# Patient Record
Sex: MALE | Employment: STUDENT | URBAN - METROPOLITAN AREA
[De-identification: names, ages, dates, MRNs, and addresses within clinical notes are randomized per-mention and may not be internally consistent; named-entity substitution may affect disease eponyms.]

---

## 2023-12-06 ENCOUNTER — OFFICE VISIT (OUTPATIENT)
Dept: PODIATRY | Facility: CLINIC | Age: 23
End: 2023-12-06
Payer: COMMERCIAL

## 2023-12-06 VITALS — WEIGHT: 153 LBS | RESPIRATION RATE: 18 BRPM | HEIGHT: 71 IN | BODY MASS INDEX: 21.42 KG/M2

## 2023-12-06 DIAGNOSIS — M79.675 PAIN IN TOE OF LEFT FOOT: ICD-10-CM

## 2023-12-06 DIAGNOSIS — L60.0 INGROWING NAIL: Primary | ICD-10-CM

## 2023-12-06 PROCEDURE — 11750 EXCISION NAIL&NAIL MATRIX: CPT | Performed by: PODIATRIST

## 2023-12-06 PROCEDURE — 99202 OFFICE O/P NEW SF 15 MIN: CPT | Performed by: PODIATRIST

## 2023-12-06 RX ORDER — LIDOCAINE HYDROCHLORIDE 10 MG/ML
1 INJECTION, SOLUTION EPIDURAL; INFILTRATION; INTRACAUDAL; PERINEURAL ONCE
Status: COMPLETED | OUTPATIENT
Start: 2023-12-06 | End: 2023-12-06

## 2023-12-06 RX ORDER — LIDOCAINE HYDROCHLORIDE AND EPINEPHRINE 10; 10 MG/ML; UG/ML
1 INJECTION, SOLUTION INFILTRATION; PERINEURAL ONCE
Status: COMPLETED | OUTPATIENT
Start: 2023-12-06 | End: 2023-12-06

## 2023-12-06 RX ADMIN — LIDOCAINE HYDROCHLORIDE 1 ML: 10 INJECTION, SOLUTION EPIDURAL; INFILTRATION; INTRACAUDAL; PERINEURAL at 11:36

## 2023-12-06 RX ADMIN — LIDOCAINE HYDROCHLORIDE AND EPINEPHRINE 1 ML: 10; 10 INJECTION, SOLUTION INFILTRATION; PERINEURAL at 11:36

## 2023-12-06 NOTE — PROGRESS NOTES
Assessment/Plan:    Explained to patient that he is dealing with an ingrown toenail affecting the lateral nail border of the left hallux. Discussed treatment options recommending partial matrixectomy. The goal of this procedure is permanent erradication of the offending nail border. Procedure performed as follows: Anesthesia via 2 cc of a one-to-one mixture of 1 percent xylocaine with epinephrine and 1 percent xylocaine plain. A Betadine prep was performed. Lateral nail border of the left great toe was avulsed to the eponychium. A partial matrixectomy was performed utilizing phenol in a standard manner. A bacitracin dressing was applied. The patient is to soak in warm water twice a day tomorrow followed by a Neosporin dressing. The patient is rescheduled in 1 week. No problem-specific Assessment & Plan notes found for this encounter. Diagnoses and all orders for this visit:    Ingrowing nail  -     lidocaine (PF) (XYLOCAINE-MPF) 1 % injection 1 mL  -     lidocaine-epinephrine (XYLOCAINE/EPINEPHRINE) 1 %-1:100,000 injection 1 mL    Pain in toe of left foot          Subjective:      Patient ID: Magdaleno Carcamo is a 21 y.o. male. HPI    Patient, 69-year-old male in good health presents with a painful ingrown toenail affecting the lateral nail border of the left great toe. The patient has had pain with this nail for the past 6 months. The following portions of the patient's history were reviewed and updated as appropriate: allergies, current medications, past family history, past medical history, past social history, past surgical history, and problem list.    Review of Systems   Gastrointestinal: Negative. Musculoskeletal: Negative. Psychiatric/Behavioral: Negative. Objective:      Resp 18   Ht 5' 11" (1.803 m)   Wt 69.4 kg (153 lb)   BMI 21.34 kg/m²          Physical Exam  Constitutional:       Appearance: Normal appearance.    Cardiovascular:      Pulses: Normal pulses. Musculoskeletal:         General: Normal range of motion. Skin:     Comments: Pain with palpation lateral nail border left hallux. No evidence of paronychia. Nail removal    Date/Time: 12/6/2023 10:30 AM    Performed by: Gopi Laura DPM  Authorized by: Gopi Laura DPM    [facility] location:  [facility]:  Consent: Verbal consent obtained. Risks and benefits: risks, benefits and alternatives were discussed  Consent given by: patient  Patient understanding: patient states understanding of the procedure being performed  Patient identity confirmed: verbally with patient    Location:     Foot:  L big toe  Pre-procedure details:     Skin preparation:  Betadine    Preparation: Patient was prepped and draped in the usual sterile fashion    Anesthesia (see MAR for exact dosages): Anesthesia method:  Nerve block    Block needle gauge:  25 G    Block anesthetic:  Lidocaine 1% WITH epi and lidocaine 1% w/o epi    Block injection procedure:  Anatomic landmarks identified    Block outcome:  Anesthesia achieved  Nail Removal:     Nail removed:  Partial    Nail side:  Lateral    Nail bed sutured: no    Ingrown nail:     Wedge excision of skin: no      Nail matrix removed or ablated:  Partial  Post-procedure details:     Dressing:  4x4 sterile gauze, antibiotic ointment and gauze roll    Patient tolerance of procedure:   Tolerated well, no immediate complications

## 2023-12-21 ENCOUNTER — OFFICE VISIT (OUTPATIENT)
Dept: PODIATRY | Facility: CLINIC | Age: 23
End: 2023-12-21

## 2023-12-21 VITALS
SYSTOLIC BLOOD PRESSURE: 108 MMHG | BODY MASS INDEX: 21.42 KG/M2 | HEART RATE: 69 BPM | WEIGHT: 153 LBS | HEIGHT: 71 IN | DIASTOLIC BLOOD PRESSURE: 71 MMHG

## 2023-12-21 DIAGNOSIS — L60.0 INGROWING NAIL: Primary | ICD-10-CM

## 2023-12-21 NOTE — PROGRESS NOTES
Patient presents approximately 2 weeks post partial matrixectomy lateral nail border left hallux.  Surgical site is healing uneventfully.  There is no evidence of infection.  No pain reported.  Mild drainage as anticipated with procedure.  Patient may discontinue soaks and Neosporin.  He should continue with dry sterile dressing until eschar forms.  Reappoint as needed